# Patient Record
Sex: FEMALE | Race: AMERICAN INDIAN OR ALASKA NATIVE | Employment: FULL TIME | URBAN - METROPOLITAN AREA
[De-identification: names, ages, dates, MRNs, and addresses within clinical notes are randomized per-mention and may not be internally consistent; named-entity substitution may affect disease eponyms.]

---

## 2022-11-17 PROBLEM — I51.89 SEVERE LEFT VENTRICULAR SYSTOLIC DYSFUNCTION: Status: ACTIVE | Noted: 2022-11-17

## 2024-03-31 ENCOUNTER — OFFICE VISIT (OUTPATIENT)
Age: 29
End: 2024-03-31

## 2024-03-31 VITALS
SYSTOLIC BLOOD PRESSURE: 124 MMHG | RESPIRATION RATE: 17 BRPM | HEIGHT: 63 IN | BODY MASS INDEX: 27.11 KG/M2 | HEART RATE: 96 BPM | WEIGHT: 153 LBS | TEMPERATURE: 98.2 F | OXYGEN SATURATION: 98 % | DIASTOLIC BLOOD PRESSURE: 82 MMHG

## 2024-03-31 DIAGNOSIS — N76.4 ABSCESS OF GENITAL LABIA: Primary | ICD-10-CM

## 2024-03-31 RX ORDER — TRAMADOL HYDROCHLORIDE 50 MG/1
50 TABLET ORAL EVERY 6 HOURS PRN
Qty: 20 TABLET | Refills: 0 | Status: SHIPPED | OUTPATIENT
Start: 2024-03-31 | End: 2024-04-05

## 2024-03-31 RX ORDER — KETOROLAC TROMETHAMINE 30 MG/ML
30 INJECTION, SOLUTION INTRAMUSCULAR; INTRAVENOUS ONCE
Status: COMPLETED | OUTPATIENT
Start: 2024-03-31 | End: 2024-03-31

## 2024-03-31 RX ORDER — SULFAMETHOXAZOLE AND TRIMETHOPRIM 800; 160 MG/1; MG/1
1 TABLET ORAL 2 TIMES DAILY
Qty: 20 TABLET | Refills: 0 | Status: SHIPPED | OUTPATIENT
Start: 2024-03-31 | End: 2024-04-10

## 2024-03-31 RX ADMIN — KETOROLAC TROMETHAMINE 30 MG: 30 INJECTION, SOLUTION INTRAMUSCULAR; INTRAVENOUS at 14:21

## 2024-04-02 ENCOUNTER — TELEPHONE (OUTPATIENT)
Age: 29
End: 2024-04-02

## 2024-04-02 DIAGNOSIS — R11.0 NAUSEA: Primary | ICD-10-CM

## 2024-04-02 RX ORDER — ONDANSETRON 4 MG/1
4 TABLET, ORALLY DISINTEGRATING ORAL 3 TIMES DAILY PRN
Qty: 21 TABLET | Refills: 0 | Status: SHIPPED | OUTPATIENT
Start: 2024-04-02

## 2024-04-02 NOTE — TELEPHONE ENCOUNTER
Pt was seen on 03/31/24 and the medication she was given is causing nausea.  Would it be possible to call in an anti-nausea medicine to [facility]?  Also, pt has an appointment with OB/GYN for condition she was seen but appointment is not until Friday.  Could she get a work note putting her off through Friday?  Please call pt back.  Electronically signed by Veena Nelson on 4/2/2024 at 1:02 PM

## 2024-04-02 NOTE — TELEPHONE ENCOUNTER
Updating work note until this Friday. Note sent via [employer]  Will send in medication for nausea to pharmacy.

## 2024-04-02 NOTE — TELEPHONE ENCOUNTER
Attempted to contact pt.  No answer, left HIPAA compliant VM.  Electronically signed by Veena Nelson on 4/2/2024 at 3:14 PM

## 2024-04-06 ENCOUNTER — OFFICE VISIT (OUTPATIENT)
Age: 29
End: 2024-04-06

## 2024-04-06 VITALS
OXYGEN SATURATION: 96 % | SYSTOLIC BLOOD PRESSURE: 119 MMHG | DIASTOLIC BLOOD PRESSURE: 78 MMHG | TEMPERATURE: 98.6 F | HEART RATE: 134 BPM

## 2024-04-06 DIAGNOSIS — R52 GENERALIZED BODY ACHES: ICD-10-CM

## 2024-04-06 DIAGNOSIS — B34.9 VIRAL INFECTION: ICD-10-CM

## 2024-04-06 DIAGNOSIS — H66.001 NON-RECURRENT ACUTE SUPPURATIVE OTITIS MEDIA OF RIGHT EAR WITHOUT SPONTANEOUS RUPTURE OF TYMPANIC MEMBRANE: Primary | ICD-10-CM

## 2024-04-06 LAB
INFLUENZA A ANTIGEN, POC: NORMAL
INFLUENZA B ANTIGEN, POC: NORMAL
Lab: NORMAL
PERFORMING INSTRUMENT: NORMAL
QC PASS/FAIL: NORMAL
SARS-COV-2, POC: NORMAL

## 2024-04-06 RX ORDER — CEFDINIR 300 MG/1
300 CAPSULE ORAL 2 TIMES DAILY
Qty: 20 CAPSULE | Refills: 0 | Status: SHIPPED | OUTPATIENT
Start: 2024-04-06 | End: 2024-04-16

## 2024-04-06 ASSESSMENT — ENCOUNTER SYMPTOMS
ABDOMINAL PAIN: 0
SINUS PRESSURE: 0
COUGH: 0
SORE THROAT: 1
SHORTNESS OF BREATH: 0
DIARRHEA: 0
VOMITING: 0
NAUSEA: 1

## 2024-04-06 NOTE — PATIENT INSTRUCTIONS
Take medication as prescribed. Discontinue Bactrim and begin new medication: Cefdinir.  Continue home Zofran as needed.   Rest and Increase fluids.    Follow up with your PCP in the next 1 week.

## 2024-04-06 NOTE — PROGRESS NOTES
Biju Naidu (:  1995) is a 28 y.o. female,Established patient, here for evaluation of the following chief complaint(s):  Headache (Started Thursday ), Fever, and Generalized Body Aches      ASSESSMENT/PLAN:    ICD-10-CM    1. Non-recurrent acute suppurative otitis media of right ear without spontaneous rupture of tympanic membrane  H66.001 cefdinir (OMNICEF) 300 MG capsule      2. Viral infection  B34.9       3. Generalized body aches  R52 POCT Influenza A/B Antigen (BD Veritor)     POCT COVID-19, Antigen          Take medication as prescribed. Discontinue Bactrim and begin new medication: Cefdinir.  Continue home Zofran as needed.   Rest and Increase fluids. Symptomatic treatment is recommended.    Follow up with your PCP in the next 1 week.     SUBJECTIVE/OBJECTIVE:    History provided by:  Patient   used: No      HPI:   28 y.o. female presents with symptoms of headache, fever, aches, ear congestion, sore throat ongoing since about 3 days. Denies SOB, known sick contacts. Has taken Bactrim for symptoms, she is taking it for vaginal abscess.    Vitals:    24 1307   BP: 119/78   Pulse: (!) 134   Temp: 98.6 °F (37 °C)   TempSrc: Temporal   SpO2: 96%       Review of Systems   Constitutional:  Positive for fatigue and fever.   HENT:  Positive for congestion, ear pain and sore throat. Negative for sinus pressure.    Respiratory:  Negative for cough and shortness of breath.    Gastrointestinal:  Positive for nausea. Negative for abdominal pain, diarrhea and vomiting.   Genitourinary:  Negative for dysuria.   Musculoskeletal:  Positive for myalgias.   Neurological:  Positive for headaches.       Physical Exam  Constitutional:       General: She is not in acute distress.     Appearance: Normal appearance.   HENT:      Right Ear: Tympanic membrane is erythematous.      Left Ear: A middle ear effusion is present.      Nose:      Right Sinus: No maxillary sinus tenderness or frontal

## 2024-05-06 ENCOUNTER — OFFICE VISIT (OUTPATIENT)
Dept: CARDIOLOGY CLINIC | Age: 29
End: 2024-05-06

## 2024-05-06 VITALS
OXYGEN SATURATION: 98 % | HEIGHT: 63 IN | BODY MASS INDEX: 29.95 KG/M2 | WEIGHT: 169 LBS | DIASTOLIC BLOOD PRESSURE: 68 MMHG | HEART RATE: 100 BPM | SYSTOLIC BLOOD PRESSURE: 124 MMHG

## 2024-05-06 DIAGNOSIS — I34.0 NONRHEUMATIC MITRAL VALVE REGURGITATION: ICD-10-CM

## 2024-05-06 DIAGNOSIS — I51.9 SEVERE LEFT VENTRICULAR SYSTOLIC DYSFUNCTION: ICD-10-CM

## 2024-05-06 DIAGNOSIS — I35.1 NONRHEUMATIC AORTIC VALVE INSUFFICIENCY: ICD-10-CM

## 2024-05-06 DIAGNOSIS — I50.21 ACUTE SYSTOLIC HEART FAILURE (HCC): ICD-10-CM

## 2024-05-06 RX ORDER — CARVEDILOL 6.25 MG/1
6.25 TABLET ORAL 2 TIMES DAILY WITH MEALS
Qty: 60 TABLET | Refills: 3 | Status: SHIPPED | OUTPATIENT
Start: 2024-05-06

## 2024-05-06 NOTE — PROGRESS NOTES
CARDIOLOGY FOLLOW UP         Patient Name: Biju Naidu  Primary Care physician: Hemanth Tony MD    Reason for Referral/Chief Complaint: Biju Naidu is a 28 y.o. 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     History of Present Illness:   Isi Gaines is a 28 y.o. female with a past medical history notable for gestational hypertension and Mayte-partum cardiomyopathy.    Patient presented to the hospital 11/15/2022 with complaints of exertional dyspnea, orthopnea and lower extremity edema.  Cardiology was consulted for concerns for postpartum cardiomyopathy.  EF 25-30%.  Discharged with life vest.     Today, she reports she has not seen a cardiologist since .  She reports feeling fair.  She is not on birth control.  She is using barrier contraception.  States saving up for a vasectomy for significant other.  She is trying to walk regularly and does yoga.  She has been out of her medications x at least 1 month.  Has not noticed any symptoms since being off the medications.   She states prior to [place] she experienced a brief episode of her heart fluttering while sitting down.  None since.  Denies syncope. The patient denies chest pain, shortness of breath at rest and dyspnea with exertion. Denies paroxysmal nocturnal dyspnea, orthopnea, increasing lower extremity edema or weight gain.  The patient endorses highest level of activity as walking and yoga.  She works full time at the [employer].      Past Medical History:   has a past medical history of Gestational HTN, New onset of congestive heart failure (HCC), and No known health problems.    Surgical History:   has a past surgical history that includes  section; Thumb amputation; and Finger surgery (Left).     Social History:   reports that she has quit smoking. Her smoking use included cigarettes. She has never used smokeless tobacco. She reports

## 2024-05-06 NOTE — PATIENT INSTRUCTIONS
PLAN:  Highly recommend you discuss with your PCP and or OB/GYN to start some form of birth control.   Echocardiogram to view size and strength of the heart  - Rick   Restart the carvedilol 6.25 mg twice daily  Restart the Entresto (at lower dose)  24/26 twice daily - start this 1 week after taking the carvedilol.  LABS in 2 weeks  BMP and BNP

## 2024-05-21 ENCOUNTER — TELEPHONE (OUTPATIENT)
Dept: CARDIOLOGY CLINIC | Age: 29
End: 2024-05-21

## 2024-05-21 NOTE — TELEPHONE ENCOUNTER
----- Message from Julius Flores MD sent at 5/21/2024  8:21 AM EDT -----  Please notify patient that their heart fxn has returned to normal  Cont current tx plan  F/u Mayur as scheduled next month

## 2024-05-23 NOTE — TELEPHONE ENCOUNTER
Júnior Ivy relayed message per [facility]. Pt v/u. Pt confirmed [facility]ER[facility] appt 6/10.

## 2024-06-10 ENCOUNTER — HOSPITAL ENCOUNTER (OUTPATIENT)
Age: 29
Discharge: HOME | End: 2024-06-10
Payer: MEDICAID

## 2024-06-10 ENCOUNTER — OFFICE VISIT (OUTPATIENT)
Dept: CARDIOLOGY CLINIC | Age: 29
End: 2024-06-10

## 2024-06-10 VITALS
HEIGHT: 63 IN | OXYGEN SATURATION: 97 % | WEIGHT: 173 LBS | BODY MASS INDEX: 30.65 KG/M2 | DIASTOLIC BLOOD PRESSURE: 68 MMHG | HEART RATE: 71 BPM | SYSTOLIC BLOOD PRESSURE: 108 MMHG

## 2024-06-10 DIAGNOSIS — I34.0 NONRHEUMATIC MITRAL VALVE REGURGITATION: ICD-10-CM

## 2024-06-10 DIAGNOSIS — I50.21 ACUTE SYSTOLIC HEART FAILURE (HCC): ICD-10-CM

## 2024-06-10 DIAGNOSIS — I35.1 NONRHEUMATIC AORTIC VALVE INSUFFICIENCY: ICD-10-CM

## 2024-06-10 DIAGNOSIS — I51.9 SEVERE LEFT VENTRICULAR SYSTOLIC DYSFUNCTION: ICD-10-CM

## 2024-06-10 LAB
ANION GAP SERPL CALCULATED.3IONS-SCNC: 9 MMOL/L (ref 3–16)
BUN SERPL-MCNC: 6 MG/DL (ref 7–20)
CALCIUM SERPL-MCNC: 9.1 MG/DL (ref 8.3–10.6)
CHLORIDE SERPL-SCNC: 107 MMOL/L (ref 99–110)
CO2 SERPL-SCNC: 27 MMOL/L (ref 21–32)
CREAT SERPL-MCNC: <0.5 MG/DL (ref 0.6–1.1)
GFR SERPLBLD CREATININE-BSD FMLA CKD-EPI: >90 ML/MIN/{1.73_M2}
GLUCOSE SERPL-MCNC: 85 MG/DL (ref 70–99)
NT-PROBNP SERPL-MCNC: 75 PG/ML (ref 0–124)
POTASSIUM SERPL-SCNC: 3.7 MMOL/L (ref 3.5–5.1)
SODIUM SERPL-SCNC: 143 MMOL/L (ref 136–145)

## 2024-06-10 PROCEDURE — 80048 BASIC METABOLIC PNL TOTAL CA: CPT

## 2024-06-10 PROCEDURE — 36415 COLL VENOUS BLD VENIPUNCTURE: CPT

## 2024-06-10 PROCEDURE — 83880 ASSAY OF NATRIURETIC PEPTIDE: CPT

## 2024-06-10 NOTE — PROGRESS NOTES
wall motion and normal relaxation.  Right ventricle is normal in size and systolic function.  Both atria are normal in size.  No significant hemodynamic valvular abnormalities.  Insufficient TR jet to estimate RVSP.  No pericardial effusion.  Compared to prior report in 11/2022, the LVEF has significantly improved    Echo: 11/16/22   Summary   Patient is noted tachycardic throughout the study.   The left ventricle is moderately dilated with normal wall thickness.   Left ventricular systolic function is severely reduced with a 3D calculated   EF of 23%.   Visually-estimated left ventricular ejection fraction is 25-30%.   Severe Global hypokinesis.   Average peak global longitudinal strain is reduced.   Elevated left ventricular filling pressure.   There is no evidence of a left ventricular thrombus.   The right ventricle is normal in size and function.   The left atrium appears severely enlarged.   The right atrium is mildly enlarged.   Linear echodensity within the right atrium. This may represent prominent   chiari network. Cor triatriatum moses cannot be excluded.   Moderate mitral regurgitation.   Mild aortic regurgitation.   An agitated saline study was performed and showed a delayed (>5 beats)   positive result suggestive of an extra-cardiac shunt.   Moderate pericardial effusion anteriorly, with small extension across the   apex to the posterior side.   Inadequate tricuspid valve regurgitation to estimate systolic pulmonary   artery pressure.   The IVC is dilated in size (>2.1 cm) and collapses <50% with respiration   consistent with markedly elevated right atrial pressures (15 mmHg) .            Impression and Plan:      History of frnady-partum cardiomyopathy  Severe Left ventricular dysfunction   -Reimplemented GDMT last OV   -In the interim echo shows complete recovery of LV function     Moderate mitral regurgitation  Mild aortic regurgitation    -Trace MR/AR following recovery of LV function.   -Cannot

## 2024-06-10 NOTE — PATIENT INSTRUCTIONS
PLAN:  LABS TODAY   We will call you with the results.   Continue with appropriate birth control/OCP with PCP vs. Gynecology for durable prevention of pregnancy. Given severity of her previous PPCM, I have recommended against future pregnancy. She voiced understanding.   Continue  the carvedilol 6.25 mg twice daily  Continue the Entresto (at lower dose)  24/26 twice daily   Will discuss coming off medications at your next OV in 6 months

## 2024-06-11 ENCOUNTER — TELEPHONE (OUTPATIENT)
Dept: CARDIOLOGY CLINIC | Age: 29
End: 2024-06-11

## 2024-06-13 ENCOUNTER — OFFICE VISIT (OUTPATIENT)
Age: 29
End: 2024-06-13

## 2024-06-13 VITALS
BODY MASS INDEX: 30.65 KG/M2 | RESPIRATION RATE: 18 BRPM | WEIGHT: 173 LBS | SYSTOLIC BLOOD PRESSURE: 119 MMHG | DIASTOLIC BLOOD PRESSURE: 77 MMHG | HEART RATE: 78 BPM | TEMPERATURE: 98.2 F | OXYGEN SATURATION: 98 % | HEIGHT: 63 IN

## 2024-06-13 DIAGNOSIS — H66.004 RECURRENT ACUTE SUPPURATIVE OTITIS MEDIA OF RIGHT EAR WITHOUT SPONTANEOUS RUPTURE OF TYMPANIC MEMBRANE: Primary | ICD-10-CM

## 2024-06-13 RX ORDER — AZITHROMYCIN 250 MG/1
TABLET, FILM COATED ORAL
Qty: 6 TABLET | Refills: 0 | Status: SHIPPED | OUTPATIENT
Start: 2024-06-13 | End: 2024-06-23

## 2024-06-13 ASSESSMENT — ENCOUNTER SYMPTOMS
SHORTNESS OF BREATH: 0
VOMITING: 0
SORE THROAT: 0
TROUBLE SWALLOWING: 0
NAUSEA: 0
COUGH: 0

## 2024-06-13 NOTE — PROGRESS NOTES
Biju Naidu (:  1995) is a 28 y.o. female,Established patient, here for evaluation of the following chief complaint(s):  Otalgia (Ear pain in right ear, felt ears popping a couple days ago after sneezing. Sore throat last night, but not today.)      ASSESSMENT/PLAN:    ICD-10-CM    1. Recurrent acute suppurative otitis media of right ear without spontaneous rupture of tympanic membrane  H66.004 azithromycin (ZITHROMAX) 250 MG tablet          Likely from  germs, patient works there.   Take medication as prescribed.   Rest and Increase fluids.  Try taking a daily antihistamine such as Claritin or Zyrtec.     Let your PCP know of your Urgent Care visit and follow up with them if symptoms are not improving.  If any worsening of symptoms go to ER for further workup and assessment.     SUBJECTIVE/OBJECTIVE:    History provided by:  Patient   used: No    Otalgia   Pertinent negatives include no coughing, ear discharge, sore throat or vomiting.     HPI:   28 y.o. female presents with symptoms of right ear pain and crackling ongoing since last 2 days. Felt the pain begin after a hard sneeze. Had sore throat but that has resolved. Has some congestion to right side. Works in . Denies fever, SOB, body aches. Has taken nothing for symptoms so far.    Pt states she has done well with Zpack in past.     Vitals:    24 1822   BP: 119/77   Pulse: 78   Resp: 18   Temp: 98.2 °F (36.8 °C)   TempSrc: Temporal   SpO2: 98%   Weight: 78.5 kg (173 lb)   Height: 1.6 m (5' 3\")       Review of Systems   Constitutional:  Negative for chills, fatigue and fever.   HENT:  Positive for congestion and ear pain. Negative for ear discharge, sore throat and trouble swallowing.    Respiratory:  Negative for cough and shortness of breath.    Gastrointestinal:  Negative for nausea and vomiting.   Musculoskeletal:  Negative for myalgias.       Physical Exam  Constitutional:       General: She is not

## 2024-08-22 ENCOUNTER — OFFICE VISIT (OUTPATIENT)
Age: 29
End: 2024-08-22

## 2024-08-22 VITALS
WEIGHT: 173 LBS | OXYGEN SATURATION: 99 % | BODY MASS INDEX: 30.65 KG/M2 | DIASTOLIC BLOOD PRESSURE: 80 MMHG | HEIGHT: 63 IN | HEART RATE: 71 BPM | SYSTOLIC BLOOD PRESSURE: 119 MMHG | TEMPERATURE: 98 F

## 2024-08-22 DIAGNOSIS — L72.9 INFECTED CYST OF SKIN: Primary | ICD-10-CM

## 2024-08-22 DIAGNOSIS — L08.9 INFECTED CYST OF SKIN: Primary | ICD-10-CM

## 2024-08-22 RX ORDER — CEPHALEXIN 500 MG/1
500 CAPSULE ORAL 3 TIMES DAILY
Qty: 30 CAPSULE | Refills: 0 | Status: SHIPPED | OUTPATIENT
Start: 2024-08-22 | End: 2024-09-01

## 2024-08-22 NOTE — PATIENT INSTRUCTIONS
Wash with antibacterial soap  Keep hydrated, tylenol or ibuprofen (if no contraindications) as needed if pain or fever..   Return sooner or go to the ER if symptoms worse/feeling worse or has new symptoms or concerns  Follow up in 7-10 days if not better   If prescribed medication : take by mouth /or apply locally..use as directed  Return any time if area looking/feeling worse, or has increase pain, increase redness, swelling, or drainage noted....if spreading or any changes as to it's appearance is noted

## 2024-08-22 NOTE — PROGRESS NOTES
Biju Naidu (:  1995) is a 29 y.o. female,Established patient, here for evaluation of the following chief complaint(s):  Other (Cyst on groin; noticed it 2/3 days ago)      ASSESSMENT/PLAN:    ICD-10-CM    1. Infected cyst of skin  L72.9 mupirocin (BACTROBAN) 2 % ointment    L08.9 cephALEXin (KEFLEX) 500 MG capsule        Early cyst/abscess.....does not appear to be any insect bite        Wash with antibacterial soap  Keep hydrated, tylenol or ibuprofen (if no contraindications) as needed if pain or fever..   Return sooner or go to the ER if symptoms worse/feeling worse or has new symptoms or concerns  Follow up in 7-10 days if not better   If prescribed medication : take by mouth /or apply locally..use as directed  Return any time if area looking/feeling worse, or has increase pain, increase redness, swelling, or drainage noted....if spreading or any changes as to it's appearance is noted      SUBJECTIVE/OBJECTIVE:  Patient presents with:  Other: Cyst on groin; noticed it 2/3 days ago  Becoming more tender, no drainage, no recent shaving locally  No fever         History provided by:  Patient   used: No        Vitals:    24 1157   BP: 119/80   Pulse: 71   Temp: 98 °F (36.7 °C)   TempSrc: Temporal   SpO2: 99%   Weight: 78.5 kg (173 lb)   Height: 1.6 m (5' 3\")       Review of Systems   Constitutional:  Negative for fever.   Skin:         Local tender area inferior to vaginal  right perineum       Physical Exam    Physical  Vitals signs: reviewed  Constitutional:  appearance: well nourished ..  does not appear acutely ill  ..no distress   Eyes:                 Pupil: equal-round-reactive to light, no photophobia, EOMI            Cornea: clear            Sclera: clear, non injected, non icteric    Neck:    supple   Pulmonary/Lungs:  effort normal, no stridor                                 Auscultation: good air movement / breath sounds normal  Cardio-vascular:  normal rate

## 2024-09-04 NOTE — TELEPHONE ENCOUNTER
----- Message from Ralph Merchant MD sent at 6/10/2024  4:48 PM EDT -----  Labs are normal - no changes.    [facility]...

## 2024-12-26 ENCOUNTER — OFFICE VISIT (OUTPATIENT)
Age: 29
End: 2024-12-26

## 2024-12-26 VITALS
HEART RATE: 70 BPM | TEMPERATURE: 97.3 F | DIASTOLIC BLOOD PRESSURE: 70 MMHG | BODY MASS INDEX: 30.65 KG/M2 | OXYGEN SATURATION: 98 % | HEIGHT: 63 IN | SYSTOLIC BLOOD PRESSURE: 116 MMHG | WEIGHT: 173 LBS

## 2024-12-26 DIAGNOSIS — H57.89 EYE REDNESS: ICD-10-CM

## 2024-12-26 DIAGNOSIS — H10.9 CONJUNCTIVITIS, BACTERIAL: Primary | ICD-10-CM

## 2024-12-26 RX ORDER — TETRACAINE HYDROCHLORIDE 5 MG/ML
2 SOLUTION OPHTHALMIC ONCE
Status: SHIPPED | OUTPATIENT
Start: 2024-12-26

## 2024-12-26 RX ORDER — ERYTHROMYCIN 5 MG/G
OINTMENT OPHTHALMIC
Qty: 3.5 G | Refills: 0 | Status: SHIPPED | OUTPATIENT
Start: 2024-12-26

## 2024-12-26 NOTE — PATIENT INSTRUCTIONS
Use drops as directed.     Do not wear any contacts.     Follow-up with ophthalmology if not improved or is worse.     [facility]  Multiple locations  [phone]

## 2024-12-26 NOTE — PROGRESS NOTES
and it started to have excess tearing. She had some mild discomfort at that time.   She awoke this AM with increased redness, tearing and new purulent discharge.     She denies photophobia.   No pain to the eyeball itself. No pain with eye movement.   does not wear contacts.          Vitals:    12/26/24 1645   BP: 116/70   Pulse: 70   Temp: 97.3 °F (36.3 °C)   TempSrc: Temporal   SpO2: 98%   Weight: 78.5 kg (173 lb)   Height: 1.6 m (5' 3\")           Physical Exam  Vitals and nursing note reviewed.   Constitutional:       Appearance: Normal appearance.   HENT:      Head: Atraumatic.      Mouth/Throat:      Mouth: Mucous membranes are moist.   Eyes:      Extraocular Movements: Extraocular movements intact.      Conjunctiva/sclera: Conjunctivae normal.      Comments: Right eye: PERRL. EOMs intact. No hyphema. No scleral icterus. . Moderately injected conjunctiva  Pain alleviated with alcaine drops. There was no visualized fluorescein dye uptake. No visualized foreign bodies. Negative Alethea sign.   Left eye: PERRL. EOMs intact. No hyphema. No scleral icterus. . Clear conjunctiva.       Cardiovascular:      Rate and Rhythm: Normal rate and regular rhythm.   Pulmonary:      Effort: Pulmonary effort is normal. No respiratory distress.   Skin:     General: Skin is warm.   Neurological:      Mental Status: She is alert.         An electronic signature was used to authenticate this note.    --Marciano Cruz PA-C

## 2025-03-07 ENCOUNTER — OFFICE VISIT (OUTPATIENT)
Age: 30
End: 2025-03-07

## 2025-03-07 VITALS
TEMPERATURE: 97.4 F | SYSTOLIC BLOOD PRESSURE: 118 MMHG | DIASTOLIC BLOOD PRESSURE: 74 MMHG | HEART RATE: 99 BPM | WEIGHT: 173 LBS | BODY MASS INDEX: 30.65 KG/M2 | OXYGEN SATURATION: 99 % | HEIGHT: 63 IN

## 2025-03-07 DIAGNOSIS — J02.9 SORE THROAT: ICD-10-CM

## 2025-03-07 DIAGNOSIS — J03.90 ACUTE TONSILLITIS, UNSPECIFIED ETIOLOGY: Primary | ICD-10-CM

## 2025-03-07 LAB — S PYO AG THROAT QL: NORMAL

## 2025-03-07 RX ORDER — AMOXICILLIN 500 MG/1
500 CAPSULE ORAL 2 TIMES DAILY
Qty: 20 CAPSULE | Refills: 0 | Status: SHIPPED | OUTPATIENT
Start: 2025-03-07 | End: 2025-03-17

## 2025-03-07 ASSESSMENT — ENCOUNTER SYMPTOMS
SORE THROAT: 1
RHINORRHEA: 0
COUGH: 0
VOMITING: 0
NAUSEA: 0

## 2025-03-07 NOTE — PATIENT INSTRUCTIONS
Keep hydrated, tylenol or ibuprofen (if no contraindications) as needed if pain or fever..  follow up in  3- days if not better..warm salt water gargle (before meals/bedtime if able) , cool liquids,   lozenges  (age appropriate)   Return sooner or go see  PCP  if symptoms worse/feeling worse or has new symptoms or concerns

## 2025-03-07 NOTE — PROGRESS NOTES
Biju Naidu (:  1995) is a 29 y.o. female,Established patient, here for evaluation of the following chief complaint(s):  Pharyngitis (2 days)      ASSESSMENT/PLAN:    ICD-10-CM    1. Acute tonsillitis, unspecified etiology  J03.90 amoxicillin (AMOXIL) 500 MG capsule      2. Sore throat  J02.9 POCT rapid strep A        Results for orders placed or performed in visit on 25   POCT rapid strep A   Result Value Ref Range    Strep A Ag None Detected None Detected     Still looks like/ concerned patient has strep throat -will start antibiotic.        Patient agreeable      Doubt patient has flu/covid based on exam, lack of other symptoms     Keep hydrated, tylenol or ibuprofen (if no contraindications) as needed if pain or fever..  follow up in  3- days if not better..warm salt water gargle (before meals/bedtime if able) , cool liquids,   lozenges  (age appropriate)   Return sooner or go see  PCP  if symptoms worse/feeling worse or has new symptoms or concerns        SUBJECTIVE/OBJECTIVE:  Patient presents with:  Pharyngitis: 2 days    Headache yesterday, but better today   Works at   No chest congestion  No N/V      History provided by:  Patient   used: No    Pharyngitis  Associated symptoms: fever, headaches and sore throat    Associated symptoms: no congestion, no cough, no ear pain, no myalgias, no nausea, no rhinorrhea and no vomiting        Vitals:    25 1304   BP: 118/74   Pulse: 99   Temp: 97.4 °F (36.3 °C)   TempSrc: Temporal   SpO2: 99%   Weight: 78.5 kg (173 lb)   Height: 1.6 m (5' 3\")       Review of Systems   Constitutional:  Positive for fever.   HENT:  Positive for sore throat. Negative for congestion, ear pain and rhinorrhea.    Respiratory:  Negative for cough.    Gastrointestinal:  Negative for nausea and vomiting.   Musculoskeletal:  Negative for myalgias.   Neurological:  Positive for headaches.       Physical Exam    Physical  Vitals signs:

## 2025-05-31 ENCOUNTER — HOSPITAL ENCOUNTER (EMERGENCY)
Age: 30
Discharge: HOME | End: 2025-05-31
Payer: MEDICAID

## 2025-05-31 VITALS
RESPIRATION RATE: 18 BRPM | SYSTOLIC BLOOD PRESSURE: 166 MMHG | DIASTOLIC BLOOD PRESSURE: 84 MMHG | BODY MASS INDEX: 33.66 KG/M2 | WEIGHT: 190 LBS | HEART RATE: 100 BPM | HEIGHT: 63 IN | TEMPERATURE: 98.5 F | OXYGEN SATURATION: 98 %

## 2025-05-31 DIAGNOSIS — K08.89 PAIN, DENTAL: Primary | ICD-10-CM

## 2025-05-31 PROCEDURE — 99283 EMERGENCY DEPT VISIT LOW MDM: CPT

## 2025-05-31 PROCEDURE — 6370000000 HC RX 637 (ALT 250 FOR IP): Performed by: PHYSICIAN ASSISTANT

## 2025-05-31 PROCEDURE — 64400 NJX AA&/STRD TRIGEMINAL NRV: CPT

## 2025-05-31 RX ORDER — PENICILLIN V POTASSIUM 500 MG/1
500 TABLET, FILM COATED ORAL 4 TIMES DAILY
Qty: 28 TABLET | Refills: 0 | Status: SHIPPED | OUTPATIENT
Start: 2025-05-31 | End: 2025-06-07

## 2025-05-31 RX ORDER — IBUPROFEN 600 MG/1
600 TABLET, FILM COATED ORAL 3 TIMES DAILY PRN
Qty: 30 TABLET | Refills: 0 | Status: SHIPPED | OUTPATIENT
Start: 2025-05-31

## 2025-05-31 RX ORDER — PENICILLIN V POTASSIUM 250 MG/1
500 TABLET, FILM COATED ORAL ONCE
Status: COMPLETED | OUTPATIENT
Start: 2025-05-31 | End: 2025-05-31

## 2025-05-31 RX ADMIN — PENICILLIN V POTASSIUM 500 MG: 250 TABLET, FILM COATED ORAL at 15:20

## 2025-05-31 ASSESSMENT — PAIN DESCRIPTION - LOCATION: LOCATION: TEETH

## 2025-05-31 ASSESSMENT — PAIN SCALES - GENERAL: PAINLEVEL_OUTOF10: 7

## 2025-05-31 ASSESSMENT — PAIN DESCRIPTION - ORIENTATION: ORIENTATION: LEFT

## 2025-05-31 ASSESSMENT — PAIN DESCRIPTION - DESCRIPTORS: DESCRIPTORS: ACHING

## 2025-05-31 ASSESSMENT — PAIN - FUNCTIONAL ASSESSMENT: PAIN_FUNCTIONAL_ASSESSMENT: 0-10

## 2025-05-31 NOTE — ED PROVIDER NOTES
[facility] EMERGENCY DEPARTMENT  eMERGENCY dEPARTMENT eNCOUnter        Pt Name: Biju Naidu  MRN: [id number]  Birthdate 1995  Date of evaluation: 2025  Provider: JESSICA GALVAN PA-C  PCP: Hemanth Tony MD  ED Attending: MD Amie    DAVID patient. This patient was not seen by the ED attending, though they were available to consult.  History is provided by the patient. No limitations.     CHIEF COMPLAINT:  Dental Pain (Left sided dental pain since yesterday, 7/10 pain)      HISTORY OF PRESENT ILLNESS:  Biju Naidu is a 29 y.o. female who presents to the ED via private vehicle with complaints of dental pain.  Patient here with dental pain to the left lower jaw that started yesterday.  She took ibuprofen initially with relief.  However, pain became worse.  She states it kept her up last night.  She has no intraoral swelling, trouble swallowing or breathing.  She does have a dentist that she plans to contact on Monday.  Pain rated 7/10 on arrival and is described as \"aching\". No other complaints, modifying factors or associated symptoms.     Nursing notes reviewed.   Past Medical History:   Diagnosis Date    Gestational HTN 2022    New onset of congestive heart failure (HCC) 2022    No known health problems      Past Surgical History:   Procedure Laterality Date     SECTION      FINGER SURGERY Left     THUMB AMPUTATION       Family History   Adopted: Yes     Social History     Socioeconomic History    Marital status: Single     Spouse name: lives with partner    Number of children: 1    Years of education: 13    Highest education level: High school graduate   Occupational History    Occupation: not employed currently   Tobacco Use    Smoking status: Former     Current packs/day: 0.50     Types: Cigarettes    Smokeless tobacco: Never   Vaping Use    Vaping status: Never Used   Substance and Sexual Activity    Alcohol use: Not Currently     Comment:

## 2025-06-24 ENCOUNTER — OFFICE VISIT (OUTPATIENT)
Age: 30
End: 2025-06-24

## 2025-06-24 VITALS
TEMPERATURE: 98.3 F | SYSTOLIC BLOOD PRESSURE: 122 MMHG | WEIGHT: 190 LBS | HEIGHT: 63 IN | BODY MASS INDEX: 33.66 KG/M2 | HEART RATE: 94 BPM | RESPIRATION RATE: 16 BRPM | DIASTOLIC BLOOD PRESSURE: 70 MMHG | OXYGEN SATURATION: 100 %

## 2025-06-24 DIAGNOSIS — H66.004 RECURRENT ACUTE SUPPURATIVE OTITIS MEDIA OF RIGHT EAR WITHOUT SPONTANEOUS RUPTURE OF TYMPANIC MEMBRANE: Primary | ICD-10-CM

## 2025-06-24 DIAGNOSIS — J02.9 SORE THROAT: ICD-10-CM

## 2025-06-24 NOTE — PROGRESS NOTES
Biju Naidu (: 1995) is a 29 y.o. female, Established patient, here for evaluation of the following chief complaint(s):  Congestion (Congestion, sore throat, ear pain, headaches states has had a fever on and off x 3 days.)      ASSESSMENT/PLAN:    ICD-10-CM    1. Recurrent acute suppurative otitis media of right ear without spontaneous rupture of tympanic membrane  H66.004 amoxicillin-clavulanate (AUGMENTIN) 875-125 MG per tablet      2. Sore throat  J02.9           - Otitis Media of the Right Ear:   Due to exam concerning for a bulging, erythematous TM, with purulent appearing effusion, concern for otitis media noted of the right ear complicating an underlying viral URI.   Low concern for otitis externa, TM perforation, cerumen impaction, foreign body within the ear canal, mastoiditis, respiratory distress, pneumonia, bronchitis, and PE.  Augmentin is prescribed for antibiotic treatment of the ear infection  Recommended OTC medication and home remedy treatments for symptomatic relief  Strict ED follow up instructions provided.  Discussed PCP follow up for persisting or worsening symptoms, or to return to the clinic if unable to obtain PCP follow up for worsening symptoms.  The patient tolerated their visit well. A time was given to answer questions and a plan was established, proposed, and was agreed upon. Reviewed AVS with treatment instructions and answered questions - patient acknowledges understanding and agreement with the discussed treatment plan and AVS instructions.      SUBJECTIVE/OBJECTIVE:  HPI:   29 y.o. female presents for complaint of sore throat, ear pain, and headache x 3 days.    Admits symptoms started 3 days ago. Started with runny nose and ear pressure. Went on vacation and swam in ocean. When she was returning home, the plane was descending and both ears popped painfully. Took a few minutes for ears to pressurize again. Has had pain since. Right ear is more painful than left.

## 2025-06-30 NOTE — PROGRESS NOTES
CARDIOLOGY OFFICE NOTE        Patient Name: Biju Naidu  Primary Care physician: Hemanth Tony MD    Reason for Referral/Chief Complaint: Biju Naidu is a 29 y.o. 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     History of Present Illness:   Isi Gaines is a 28 y.o. female with a past medical history notable for gestational hypertension, preeclampsia and Mayte-partum cardiomyopathy.    Patient presented to the hospital 11/15/2022 with complaints of exertional dyspnea, orthopnea and lower extremity edema.  Cardiology was consulted for concerns for postpartum cardiomyopathy.  EF 25-30%.  Discharged with life vest. Rx with brief course AC for prevention.     OV 5/6/24 at which time patient reestablish care following last evaluation 2022.  Noted off all GDMT.  No durable birth control.  She was performing yoga and walking regularly with no cardiac limitations or symptoms.  Brief episodes of heart fluttering endorsed.  No syncope.  Carvedilol and Entresto were restarted in a graded fashion.  Labs ordered.  Echo ordered.    In the interim echocardiogram on 2/20/24 showed EF 55-59%, no significant hemodynamic valvular abnormalities, no PE.  Note made of inability to exclude bicuspid aortic valve.  LVEF normalized.     LOV 6/10/24 at which time patient was noted doing well.  Rare palpitations.  No chest pain.  No heart failure symptoms.  She had been taking carvedilol and Entresto faithfully.  We discussed weaning from these over time and reassessing LVEF following stopping the medications.    Today she reports feeling well.   She states she is no longer taking Entresto or Carvedilol.   Patient denies chest pain, shortness of breath, palpitations, dizziness or syncope. Her 3 children are doing well. She notes she is doing ok. Started on anti-depressant. Reports helping with symptoms. We discussed role of counseling and she was open

## 2025-07-01 ENCOUNTER — OFFICE VISIT (OUTPATIENT)
Dept: CARDIOLOGY CLINIC | Age: 30
End: 2025-07-01
Payer: MEDICAID

## 2025-07-01 VITALS
WEIGHT: 193 LBS | DIASTOLIC BLOOD PRESSURE: 80 MMHG | SYSTOLIC BLOOD PRESSURE: 122 MMHG | BODY MASS INDEX: 34.2 KG/M2 | HEIGHT: 63 IN

## 2025-07-01 DIAGNOSIS — I34.0 NONRHEUMATIC MITRAL VALVE REGURGITATION: ICD-10-CM

## 2025-07-01 DIAGNOSIS — I35.1 NONRHEUMATIC AORTIC VALVE INSUFFICIENCY: ICD-10-CM

## 2025-07-01 DIAGNOSIS — R01.1 HEART MURMUR: ICD-10-CM

## 2025-07-01 PROCEDURE — 1036F TOBACCO NON-USER: CPT | Performed by: INTERNAL MEDICINE

## 2025-07-01 PROCEDURE — 99214 OFFICE O/P EST MOD 30 MIN: CPT | Performed by: INTERNAL MEDICINE

## 2025-07-01 PROCEDURE — G8427 DOCREV CUR MEDS BY ELIG CLIN: HCPCS | Performed by: INTERNAL MEDICINE

## 2025-07-01 PROCEDURE — G2211 COMPLEX E/M VISIT ADD ON: HCPCS | Performed by: INTERNAL MEDICINE

## 2025-07-01 PROCEDURE — 93000 ELECTROCARDIOGRAM COMPLETE: CPT | Performed by: INTERNAL MEDICINE

## 2025-07-01 PROCEDURE — G8417 CALC BMI ABV UP PARAM F/U: HCPCS | Performed by: INTERNAL MEDICINE

## 2025-07-01 NOTE — PATIENT INSTRUCTIONS
PLAN:  Limited Echocardiogram to view size and strength of the heart   To schedule outpatient testing, contact Central Scheduling by calling  ([phone]).  We will call you with the results